# Patient Record
Sex: FEMALE | Race: WHITE
[De-identification: names, ages, dates, MRNs, and addresses within clinical notes are randomized per-mention and may not be internally consistent; named-entity substitution may affect disease eponyms.]

---

## 2017-07-29 ENCOUNTER — HEALTH MAINTENANCE LETTER (OUTPATIENT)
Age: 37
End: 2017-07-29

## 2018-05-02 ENCOUNTER — MYC MEDICAL ADVICE (OUTPATIENT)
Dept: RHEUMATOLOGY | Facility: CLINIC | Age: 38
End: 2018-05-02

## 2019-06-26 ENCOUNTER — TELEPHONE (OUTPATIENT)
Dept: RHEUMATOLOGY | Facility: CLINIC | Age: 39
End: 2019-06-26

## 2019-06-26 NOTE — TELEPHONE ENCOUNTER
Patient contacted and reminded of upcoming appointment.  Patient confirmed they will be attending.  Patient instructed to bring updated medications list to appointment.        Neno Jones  EMT

## 2019-06-27 ENCOUNTER — OFFICE VISIT (OUTPATIENT)
Dept: RHEUMATOLOGY | Facility: CLINIC | Age: 39
End: 2019-06-27
Attending: INTERNAL MEDICINE
Payer: COMMERCIAL

## 2019-06-27 VITALS
DIASTOLIC BLOOD PRESSURE: 75 MMHG | WEIGHT: 180 LBS | BODY MASS INDEX: 24.41 KG/M2 | HEART RATE: 65 BPM | SYSTOLIC BLOOD PRESSURE: 150 MMHG | TEMPERATURE: 98 F | OXYGEN SATURATION: 98 %

## 2019-06-27 DIAGNOSIS — R19.7 INTERMITTENT DIARRHEA: ICD-10-CM

## 2019-06-27 DIAGNOSIS — K59.09 INTERMITTENT CONSTIPATION: ICD-10-CM

## 2019-06-27 DIAGNOSIS — R76.8 POSITIVE ANA (ANTINUCLEAR ANTIBODY): ICD-10-CM

## 2019-06-27 DIAGNOSIS — R76.8 POSITIVE ANA (ANTINUCLEAR ANTIBODY): Primary | ICD-10-CM

## 2019-06-27 LAB
ALBUMIN SERPL-MCNC: 3.9 G/DL (ref 3.4–5)
ALBUMIN UR-MCNC: NEGATIVE MG/DL
ALT SERPL W P-5'-P-CCNC: 36 U/L (ref 0–50)
APPEARANCE UR: CLEAR
AST SERPL W P-5'-P-CCNC: 26 U/L (ref 0–45)
BACTERIA #/AREA URNS HPF: ABNORMAL /HPF
BASOPHILS # BLD AUTO: 0 10E9/L (ref 0–0.2)
BASOPHILS NFR BLD AUTO: 0.5 %
BILIRUB UR QL STRIP: NEGATIVE
C3 SERPL-MCNC: 75 MG/DL (ref 76–169)
C4 SERPL-MCNC: 20 MG/DL (ref 15–50)
COLOR UR AUTO: YELLOW
CREAT SERPL-MCNC: 0.77 MG/DL (ref 0.52–1.04)
CREAT UR-MCNC: 73 MG/DL
CREAT UR-MCNC: 73 MG/DL
CRP SERPL-MCNC: <2.9 MG/L (ref 0–8)
DAT POLY-SP REAG RBC QL: NORMAL
DEPRECATED CALCIDIOL+CALCIFEROL SERPL-MC: 33 UG/L (ref 20–75)
DIFFERENTIAL METHOD BLD: NORMAL
ENA RNP IGG SER IA-ACNC: 0.3 AI (ref 0–0.9)
ENA SCL70 IGG SER IA-ACNC: <0.2 AI (ref 0–0.9)
ENA SM IGG SER-ACNC: <0.2 AI (ref 0–0.9)
ENA SS-A IGG SER IA-ACNC: <0.2 AI (ref 0–0.9)
ENA SS-B IGG SER IA-ACNC: <0.2 AI (ref 0–0.9)
EOSINOPHIL # BLD AUTO: 0.1 10E9/L (ref 0–0.7)
EOSINOPHIL NFR BLD AUTO: 0.9 %
ERYTHROCYTE [DISTWIDTH] IN BLOOD BY AUTOMATED COUNT: 12.2 % (ref 10–15)
ERYTHROCYTE [SEDIMENTATION RATE] IN BLOOD BY WESTERGREN METHOD: 8 MM/H (ref 0–20)
GFR SERPL CREATININE-BSD FRML MDRD: >90 ML/MIN/{1.73_M2}
GLUCOSE UR STRIP-MCNC: NEGATIVE MG/DL
HCT VFR BLD AUTO: 41.8 % (ref 35–47)
HGB BLD-MCNC: 13.8 G/DL (ref 11.7–15.7)
HGB UR QL STRIP: NEGATIVE
IMM GRANULOCYTES # BLD: 0 10E9/L (ref 0–0.4)
IMM GRANULOCYTES NFR BLD: 0.2 %
KETONES UR STRIP-MCNC: NEGATIVE MG/DL
LEUKOCYTE ESTERASE UR QL STRIP: NEGATIVE
LYMPHOCYTES # BLD AUTO: 2.2 10E9/L (ref 0.8–5.3)
LYMPHOCYTES NFR BLD AUTO: 39 %
MCH RBC QN AUTO: 31.3 PG (ref 26.5–33)
MCHC RBC AUTO-ENTMCNC: 33 G/DL (ref 31.5–36.5)
MCV RBC AUTO: 95 FL (ref 78–100)
MONOCYTES # BLD AUTO: 0.5 10E9/L (ref 0–1.3)
MONOCYTES NFR BLD AUTO: 8.3 %
MUCOUS THREADS #/AREA URNS LPF: PRESENT /LPF
NEUTROPHILS # BLD AUTO: 2.8 10E9/L (ref 1.6–8.3)
NEUTROPHILS NFR BLD AUTO: 51.1 %
NITRATE UR QL: NEGATIVE
NRBC # BLD AUTO: 0 10*3/UL
NRBC BLD AUTO-RTO: 0 /100
PH UR STRIP: 6 PH (ref 5–7)
PLATELET # BLD AUTO: 192 10E9/L (ref 150–450)
PROT UR-MCNC: 0.06 G/L
PROT/CREAT 24H UR: 0.09 G/G CR (ref 0–0.2)
RBC # BLD AUTO: 4.41 10E12/L (ref 3.8–5.2)
RBC #/AREA URNS AUTO: 1 /HPF (ref 0–2)
RHEUMATOID FACT SER NEPH-ACNC: <20 IU/ML (ref 0–20)
SOURCE: ABNORMAL
SP GR UR STRIP: 1.01 (ref 1–1.03)
SQUAMOUS #/AREA URNS AUTO: 1 /HPF (ref 0–1)
THYROGLOB AB SERPL IA-ACNC: <20 IU/ML (ref 0–40)
THYROPEROXIDASE AB SERPL-ACNC: <10 IU/ML
UROBILINOGEN UR STRIP-MCNC: 0 MG/DL (ref 0–2)
WBC # BLD AUTO: 5.6 10E9/L (ref 4–11)
WBC #/AREA URNS AUTO: 1 /HPF (ref 0–5)

## 2019-06-27 PROCEDURE — 85652 RBC SED RATE AUTOMATED: CPT | Performed by: INTERNAL MEDICINE

## 2019-06-27 PROCEDURE — 86146 BETA-2 GLYCOPROTEIN ANTIBODY: CPT | Performed by: INTERNAL MEDICINE

## 2019-06-27 PROCEDURE — 86880 COOMBS TEST DIRECT: CPT | Performed by: INTERNAL MEDICINE

## 2019-06-27 PROCEDURE — 86376 MICROSOMAL ANTIBODY EACH: CPT | Performed by: INTERNAL MEDICINE

## 2019-06-27 PROCEDURE — 82565 ASSAY OF CREATININE: CPT | Performed by: INTERNAL MEDICINE

## 2019-06-27 PROCEDURE — 86431 RHEUMATOID FACTOR QUANT: CPT | Performed by: INTERNAL MEDICINE

## 2019-06-27 PROCEDURE — 85025 COMPLETE CBC W/AUTO DIFF WBC: CPT | Performed by: INTERNAL MEDICINE

## 2019-06-27 PROCEDURE — 84156 ASSAY OF PROTEIN URINE: CPT | Performed by: INTERNAL MEDICINE

## 2019-06-27 PROCEDURE — 36415 COLL VENOUS BLD VENIPUNCTURE: CPT | Performed by: INTERNAL MEDICINE

## 2019-06-27 PROCEDURE — 82306 VITAMIN D 25 HYDROXY: CPT | Performed by: INTERNAL MEDICINE

## 2019-06-27 PROCEDURE — 86235 NUCLEAR ANTIGEN ANTIBODY: CPT | Performed by: INTERNAL MEDICINE

## 2019-06-27 PROCEDURE — 84460 ALANINE AMINO (ALT) (SGPT): CPT | Performed by: INTERNAL MEDICINE

## 2019-06-27 PROCEDURE — G0463 HOSPITAL OUTPT CLINIC VISIT: HCPCS | Mod: ZF

## 2019-06-27 PROCEDURE — 82040 ASSAY OF SERUM ALBUMIN: CPT | Performed by: INTERNAL MEDICINE

## 2019-06-27 PROCEDURE — 86200 CCP ANTIBODY: CPT | Performed by: INTERNAL MEDICINE

## 2019-06-27 PROCEDURE — 86800 THYROGLOBULIN ANTIBODY: CPT | Performed by: INTERNAL MEDICINE

## 2019-06-27 PROCEDURE — 86160 COMPLEMENT ANTIGEN: CPT | Performed by: INTERNAL MEDICINE

## 2019-06-27 PROCEDURE — 81001 URINALYSIS AUTO W/SCOPE: CPT | Performed by: INTERNAL MEDICINE

## 2019-06-27 PROCEDURE — 86140 C-REACTIVE PROTEIN: CPT | Performed by: INTERNAL MEDICINE

## 2019-06-27 PROCEDURE — 84450 TRANSFERASE (AST) (SGOT): CPT | Performed by: INTERNAL MEDICINE

## 2019-06-27 PROCEDURE — 86225 DNA ANTIBODY NATIVE: CPT | Performed by: INTERNAL MEDICINE

## 2019-06-27 RX ORDER — TURMERIC 100 %
POWDER (GRAM) MISCELLANEOUS
COMMUNITY
Start: 2018-06-15

## 2019-06-27 ASSESSMENT — PAIN SCALES - GENERAL: PAINLEVEL: NO PAIN (0)

## 2019-06-27 NOTE — LETTER
Patient:  Beth Gold  :   1980  MRN:     [id number]        Ms.Tonya Gold  [address] [place], [place] [id number]        2019    Dear ,    We are writing to inform you of your test results. Overall labs look good (good news!). C3 is slightly down but is within acceptable range. Recommend vit D 2000 units a day to keep vit D around 40.      Results for orders placed or performed in visit on 19   Anti thyroglobulin antibody   Result Value Ref Range    Thyroglobulin Antibody <20 <40 IU/mL   Thyroid peroxidase antibody   Result Value Ref Range    Thyroid Peroxidase Antibody <10 <35 IU/mL   Vitamin D Deficiency   Result Value Ref Range    Vitamin D Deficiency screening 33 20 - 75 ug/L   BARBIE Panel (RNP, SMITH, Scleroderma, SSAB, SSBB); BARBIE, Antibodies, Panel   Result Value Ref Range    RNP Antibody IgG 0.3 0.0 - 0.9 AI    [place] BARBIE Antibody IgG <0.2 0.0 - 0.9 AI    SSA (Ro) (BARBIE) Antibody, IgG <0.2 0.0 - 0.9 AI    SSB (La) (BARBIE) Antibody, IgG <0.2 0.0 - 0.9 AI    Scleroderma Antibody Scl-70 BARBIE IgG <0.2 0.0 - 0.9 AI   Rheumatoid factor   Result Value Ref Range    Rheumatoid Factor <20 <20 IU/mL   Cyclic Citrullinated Peptide Antibody IgG   Result Value Ref Range    Cyclic Citrullinated Peptide Antibody, IgG 1 <7 U/mL   Beta 2 Glycoprotein 1 Antibody IgG   Result Value Ref Range    Beta 2 Glycoprotein 1 Antibody IgG 1.0 <7 U/mL   Beta 2 Glycoprotein 1 Antibody IgM   Result Value Ref Range    Beta 2 Glycoprotein 1 Antibody IgM 5.2 <7 U/mL   Creatinine random urine   Result Value Ref Range    Creatinine Urine Random 73 mg/dL   Protein  random urine with Creat Ratio   Result Value Ref Range    Protein Random Urine 0.06 g/L    Protein Total Urine g/gr Creatinine 0.09 0 - 0.2 g/g Cr   UA with Microscopic reflex to Culture   Result Value Ref Range    Color Urine Yellow     Appearance Urine Clear     Glucose Urine Negative NEG^Negative mg/dL    Bilirubin Urine Negative NEG^Negative    Ketones  Urine Negative NEG^Negative mg/dL    Specific Gravity Urine 1.015 1.003 - 1.035    Blood Urine Negative NEG^Negative    pH Urine 6.0 5.0 - 7.0 pH    Protein Albumin Urine Negative NEG^Negative mg/dL    Urobilinogen mg/dL 0.0 0.0 - 2.0 mg/dL    Nitrite Urine Negative NEG^Negative    Leukocyte Esterase Urine Negative NEG^Negative    Source Midstream Urine     WBC Urine 1 0 - 5 /HPF    RBC Urine 1 0 - 2 /HPF    Bacteria Urine Few (A) NEG^Negative /HPF    Squamous Epithelial /HPF Urine 1 0 - 1 /HPF    Mucous Urine Present (A) NEG^Negative /LPF   Erythrocyte sedimentation rate auto   Result Value Ref Range    Sed Rate 8 0 - 20 mm/h   DNA double stranded antibodies   Result Value Ref Range    DNA-ds 2 <10 IU/mL   CRP inflammation   Result Value Ref Range    CRP Inflammation <2.9 0.0 - 8.0 mg/L   Complement C3   Result Value Ref Range    Complement C3 75 (L) 76 - 169 mg/dL   Complement C4   Result Value Ref Range    Complement C4 20 15 - 50 mg/dL   Creatinine   Result Value Ref Range    Creatinine 0.77 0.52 - 1.04 mg/dL    GFR Estimate >90 >60 mL/min/[1.73_m2]    GFR Estimate If Black >90 >60 mL/min/[1.73_m2]   CBC with platelets differential   Result Value Ref Range    WBC 5.6 4.0 - 11.0 10e9/L    RBC Count 4.41 3.8 - 5.2 10e12/L    Hemoglobin 13.8 11.7 - 15.7 g/dL    Hematocrit 41.8 35.0 - 47.0 %    MCV 95 78 - 100 fl    MCH 31.3 26.5 - 33.0 pg    MCHC 33.0 31.5 - 36.5 g/dL    RDW 12.2 10.0 - 15.0 %    Platelet Count 192 150 - 450 10e9/L    Diff Method Automated Method     % Neutrophils 51.1 %    % Lymphocytes 39.0 %    % Monocytes 8.3 %    % Eosinophils 0.9 %    % Basophils 0.5 %    % Immature Granulocytes 0.2 %    Nucleated RBCs 0 0 /100    Absolute Neutrophil 2.8 1.6 - 8.3 10e9/L    Absolute Lymphocytes 2.2 0.8 - 5.3 10e9/L    Absolute Monocytes 0.5 0.0 - 1.3 10e9/L    Absolute Eosinophils 0.1 0.0 - 0.7 10e9/L    Absolute Basophils 0.0 0.0 - 0.2 10e9/L    Abs Immature Granulocytes 0.0 0 - 0.4 10e9/L    Absolute  Nucleated RBC 0.0    AST   Result Value Ref Range    AST 26 0 - 45 U/L   Albumin level   Result Value Ref Range    Albumin 3.9 3.4 - 5.0 g/dL   ALT   Result Value Ref Range    ALT 36 0 - 50 U/L   Creatinine urine calculation only   Result Value Ref Range    Creatinine Urine 73 mg/dL   Direct antiglobulin test (DATG)   Result Value Ref Range    MARS  Broad Spectrum Neg        [facility]ATZ

## 2019-06-27 NOTE — PROGRESS NOTES
Rheumatology Clinic Visit  Beth Gold MRN# [id number]   YOB: 1980 Age: 39 year old     Date of last Visit: 8/12/2016  DOS; 6/27/2019  Reason for visit: F/U possible CTD with high titer ROBINSON (10.9 on EIA).          Assessment and Plan:   Diagnosis:  # Positive ROBINSON, high titer EIA  # Peripheral Polyarticular Arthralgias  # WPW  # h/o Migraines  # past right knee trauma and arthroscopy    Discussion:  Ongoing arthralgia, AM stiffness but tolerable plus markedly elevated ROBINSON by EIA at 10.9. Normal exam, joint pain improved since last visit without taking any medications.    Plan:  GI referral given intermittent diarrhea, constipation  Serologies eval for SLE, other CTD, Antiphospholipid syndrome   Discussed at length symptoms to monitor for and watch.  F/U PRN         Orders Placed This Encounter   Procedures     ALT     Albumin level     AST     CBC with platelets differential     Creatinine     Complement C4     Complement C3     CRP inflammation     DNA double stranded antibodies     Erythrocyte sedimentation rate auto     UA with Microscopic reflex to Culture     Protein  random urine with Creat Ratio     Creatinine random urine     Beta 2 Glycoprotein 1 Antibody IgM     Beta 2 Glycoprotein 1 Antibody IgG     Cyclic Citrullinated Peptide Antibody IgG     Rheumatoid factor     BARBIE Panel (RNP, SMITH, Scleroderma, SSAB, SSBB); BARBIE, Antibodies, Panel     Vitamin D Deficiency     Thyroid peroxidase antibody     Anti thyroglobulin antibody     GASTROENTEROLOGY ADULT REF CONSULT ONLY     Direct antiglobulin test (DATG)               Active Problem List:   Nocturnal Arthralgias, improving         History of Present Illness:   Beth Gold is a 36 year old female who presents for further evaluation regarding possible CTD with high titer ROBINSON.    Beth Gold is a 35 y/o female with h/o WPW, MVR, migraines with visual aura, h/o right knee ACL repair, and infertility s/p twin boy delivery with IVF who presents  "to rheumatology for evaluation of potential CTD given her positive ROBINSON and arthralgias.     She was recently seen by Hematology/Oncology to be evaluated for hemachromatosis as her mother has hemachromatosis with involvement of her brain. Subsequent evaluation in [place] is negative for hemachromatosis, but returned markedly elevated ROBINSON by EIA at 10.9.     Beth mentions that she is overall healthy and active but noticed after the delivery of her twins she developed significant sharp stabbing pains in her wrist, left elbow (also got swollen), shoulder and hands bilaterally. Her pains and stiffness only occurred at night and she did not have any residual issues during the day. This was worsened with the subsequent sleep deprivation from twin newborns and she saw rheumatology and was found to have positive ROBINSON, but negative RA, SPEP, Lyme, SLE, or Sjogren evaluations. It was suspected to be related to her sleep and sleep deprivation and she noticed some improvement when she was able to get sleep. It overall mildly improved until the fall of 2015 when she had significant improvement in her joint pains because her kids started sleeping more consistently. Now she will notice when she \"needs to go to bed early\" and will be able to curb any stiffness in her peripheral joints. She denies any swelling now nor any erythema. She mentions that occasionally she will have soreness in the AM that will last for a couple of hours and resolve by the time the kids are in school. She notes that maybe 1 week before and a little after her menstruation she will have some soreness as well. She denies any consistent AM daily stiffness.    She denied any issues with new rash, raynauds, no mouth ulcers, no uveitis, no LBP or hip pain, no psoriasis, no IBD, no hematochezia, no hemoptysis, no dry eyes or dry mouth, no hematuria.      Today 6/27/2019: She was seen in 2016 by our former fellow Dr. López. Returned for f/u, has some ongoing sx.  Her " legs, ankles, feet are stiff in AM x 50 steps then they feel better. Has some focus issues over past few weeks with migraines. Has irregular BM, with constipation and diarrhea. No blood in stool. Has not discussed irregular BM with her PCP.      No skin rashes. Gets hives from Sun exposure. No oral ulcers. Gets nosebleeds 1-2/ months. Has dry eyes. No dry mouth, CP, SOB.    Lack of sleep and sugar worsen AM stiffness.      Gets hand,wrist, feet and ankle pain if she does not get enough sleep.    2012 with Pos ROBINSON 1:160, negative SS-A, SS-B, negative SPEP, negative Lyme, negative CCP, neg RF. Neg HIV, neg HepBsAg.  2016: ROBINSON 1:80, with CRP 0.08, ESR 6         Review of Systems:   A comprehensive ROS was done. Positives are per HPI.            Past Medical History:     Per outside records.   WPW without ablation (recommended in ) and has been asymptomatic.  H/o migraine with visual aura  H/o right knee ACL tear with repair and subsequent arthroscopies.  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   Denies any smoking  Rare ETOH  Denies any illicit or recreational drug use.         Family History:     Mother with Hemachromatosis and subsequent NBIA (brain involvement), HTN  Father with HTN, DM2, MI, x3, Thyroid issues.    Grandmother with RA, denies any other family members with SLE.   Her two children with no congenital heart block or  SLE.         Allergies:     Allergies   Allergen Reactions     Celebrex [Celecoxib] Anaphylaxis     Celebrex      Amoxicillin Rash   Mentions burns easily with sun exposure.         Medications:     Current Outpatient Medications   Medication Sig Dispense Refill     Calcium Carb-Cholecalciferol (CALCIUM-VITAMIN D) 600-400 MG-UNIT TABS        Coenzyme Q10 (COQ-10 PO)        GLUCOSAMINE-CHONDROITIN PO        Multiple Vitamins-Minerals (MULTIVITAMIN ADULT PO)        NEW MED Atheltic Greens       Omega-3 Fatty Acids (SUPER OMEGA 3 EPA/DHA PO) Take 660 mg by mouth 2 times daily       Turmeric POWD               Physical Exam:   Blood pressure 150/75, pulse 65, temperature 98  F (36.7  C), temperature source Oral, weight 81.6 kg (180 lb), SpO2 98 %.  Wt Readings from Last 4 Encounters:   06/27/19 81.6 kg (180 lb)   09/07/16 83.1 kg (183 lb 3.2 oz)   08/12/16 83 kg (183 lb)   08/03/16 83.5 kg (184 lb 1.6 oz)     Patient height not recorded    Constitutional: pleasant,healthy looking,  and twin sons present  Eyes: nl EOM, PERRLA, conjunctiva, sclera  ENT: nl external ears, nose, lips, teeth, gums, throat  No mucous membrane lesions, normal saliva pool, no parotid enlargement  Neck: no mass or thyroid enlargement  Resp: lungs clear to auscultation  CV: RRR, no murmurs, rubs or gallops, no edema  GI: soft, no ABD tenderness  Lymph: no cervical, supraclavicular nodes  MS: no active synovitis or joint tenderness  Skin: no nail pitting, alopecia, rash  Neuro: nl cranial nerves, strength  Psych: nl affect.         Data:     Results for orders placed or performed in visit on 09/07/16   CBC with platelets differential   Result Value Ref Range    WBC 6.2 4.0 - 11.0 10e9/L    RBC Count 4.39 3.8 - 5.2 10e12/L    Hemoglobin 13.3 11.7 - 15.7 g/dL    Hematocrit 39.7 35.0 - 47.0 %    MCV 90 78 - 100 fl    MCH 30.3 26.5 - 33.0 pg    MCHC 33.5 31.5 - 36.5 g/dL    RDW 12.3 10.0 - 15.0 %    Platelet Count 201 150 - 450 10e9/L    Diff Method Automated Method     % Neutrophils 41.6 %    % Lymphocytes 49.8 %    % Monocytes 7.0 %    % Eosinophils 1.0  %    % Basophils 0.3 %    % Immature Granulocytes 0.3 %    Nucleated RBCs 0 0 /100    Absolute Neutrophil 2.6 1.6 - 8.3 10e9/L    Absolute Lymphocytes 3.1 0.8 - 5.3 10e9/L    Absolute Monocytes 0.4 0.0 - 1.3 10e9/L    Absolute Eosinophils 0.1 0.0 - 0.7 10e9/L    Absolute Basophils 0.0 0.0 - 0.2 10e9/L    Abs Immature Granulocytes 0.0 0 - 0.4 10e9/L    Absolute Nucleated RBC 0.0    CRP inflammation   Result Value Ref Range    CRP Inflammation <2.9 0.0 - 8.0 mg/L   Erythrocyte sedimentation rate auto   Result Value Ref Range    Sed Rate 10 0 - 20 mm/h       Recent Labs   Lab Test  08/03/16   1441   WBC  6.3   RBC  4.39   HGB  13.4   HCT  40.6   MCV  93   RDW  12.2   PLT  206   ALBUMIN  4.0   BUN  15     Hemoglobin   Date Value Ref Range Status   09/07/2016 13.3 11.7 - 15.7 g/dL Final   08/03/2016 13.4 11.7 - 15.7 g/dL Final     Urea Nitrogen   Date Value Ref Range Status   08/03/2016 15 7 - 30 mg/dL Final     Sed Rate   Date Value Ref Range Status   09/07/2016 10 0 - 20 mm/h Final     CRP Inflammation   Date Value Ref Range Status   09/07/2016 <2.9 0.0 - 8.0 mg/L Final     AST   Date Value Ref Range Status   08/03/2016 29 0 - 45 U/L Final     Albumin   Date Value Ref Range Status   08/03/2016 4.0 3.4 - 5.0 g/dL Final     Alkaline Phosphatase   Date Value Ref Range Status   08/03/2016 47 40 - 150 U/L Final     ALT   Date Value Ref Range Status   08/03/2016 31 0 - 50 U/L Final     Recent Labs   Lab Test 09/07/16  1217 08/03/16  1441   WBC 6.2 6.3   HGB 13.3 13.4   HCT 39.7 40.6   MCV 90 93    206   BUN  --  15   AST  --  29   ALT  --  31   ALKPHOS  --  47     Other studies: reviewed in Epic    Outside studies/notes reviewed: Care Everywhere from [facility]:  5/2012 with Pos ROBINSON 1:160, negative SS-a, SS-B, negative SPEP, negative Lyme, negative CCP, neg RF. Neg HIV, neg HepBsAg.  7/2016: ROBINSON 1:80, with CRP 0.08, ESR 6    Reviewed Rheumatology lab flowsheet

## 2019-06-27 NOTE — LETTER
6/27/2019      RE: Beth Gold  [address] [place] [id number]       Rheumatology Clinic Visit  Beth Gold MRN# [id number]   YOB: 1980 Age: 39 year old     Date of last Visit: 8/12/2016  DOS; 6/27/2019  Reason for visit: F/U possible CTD with high titer ROBINSON (10.9 on EIA).          Assessment and Plan:   Diagnosis:  # Positive ROBINSON, high titer EIA  # Peripheral Polyarticular Arthralgias  # WPW  # h/o Migraines  # past right knee trauma and arthroscopy    Discussion:  Ongoing arthralgia, AM stiffness but tolerable plus markedly elevated ROBINSON by EIA at 10.9. Normal exam, joint pain improved since last visit without taking any medications.    Plan:  GI referral given intermittent diarrhea, constipation  Serologies eval for SLE, other CTD, Antiphospholipid syndrome   Discussed at length symptoms to monitor for and watch.  F/U PRN         Orders Placed This Encounter   Procedures     ALT     Albumin level     AST     CBC with platelets differential     Creatinine     Complement C4     Complement C3     CRP inflammation     DNA double stranded antibodies     Erythrocyte sedimentation rate auto     UA with Microscopic reflex to Culture     Protein  random urine with Creat Ratio     Creatinine random urine     Beta 2 Glycoprotein 1 Antibody IgM     Beta 2 Glycoprotein 1 Antibody IgG     Cyclic Citrullinated Peptide Antibody IgG     Rheumatoid factor     BARBIE Panel (RNP, SMITH, Scleroderma, SSAB, SSBB); BARBIE, Antibodies, Panel     Vitamin D Deficiency     Thyroid peroxidase antibody     Anti thyroglobulin antibody     GASTROENTEROLOGY ADULT REF CONSULT ONLY     Direct antiglobulin test (DATG)               Active Problem List:   Nocturnal Arthralgias, improving         History of Present Illness:   Beth Gold is a 36 year old female who presents for further evaluation regarding possible CTD with high titer ROBINSON.    Beth Gold is a 37 y/o female with h/o WPW, MVR, migraines with visual aura, h/o right  "knee ACL repair, and infertility s/p twin boy delivery with IVF who presents to rheumatology for evaluation of potential CTD given her positive ROBINSON and arthralgias.     She was recently seen by Hematology/Oncology to be evaluated for hemachromatosis as her mother has hemachromatosis with involvement of her brain. Subsequent evaluation in [place] is negative for hemachromatosis, but returned markedly elevated ROBINSON by EIA at 10.9.     Beth mentions that she is overall healthy and active but noticed after the delivery of her twins she developed significant sharp stabbing pains in her wrist, left elbow (also got swollen), shoulder and hands bilaterally. Her pains and stiffness only occurred at night and she did not have any residual issues during the day. This was worsened with the subsequent sleep deprivation from twin newborns and she saw rheumatology and was found to have positive ROBINSON, but negative RA, SPEP, Lyme, SLE, or Sjogren evaluations. It was suspected to be related to her sleep and sleep deprivation and she noticed some improvement when she was able to get sleep. It overall mildly improved until the fall of 2015 when she had significant improvement in her joint pains because her kids started sleeping more consistently. Now she will notice when she \"needs to go to bed early\" and will be able to curb any stiffness in her peripheral joints. She denies any swelling now nor any erythema. She mentions that occasionally she will have soreness in the AM that will last for a couple of hours and resolve by the time the kids are in school. She notes that maybe 1 week before and a little after her menstruation she will have some soreness as well. She denies any consistent AM daily stiffness.    She denied any issues with new rash, raynauds, no mouth ulcers, no uveitis, no LBP or hip pain, no psoriasis, no IBD, no hematochezia, no hemoptysis, no dry eyes or dry mouth, no hematuria.      Today 6/27/2019: She was seen in 2016 " by our former fellow Dr. López. Returned for f/u, has some ongoing sx.  Her legs, ankles, feet are stiff in AM x 50 steps then they feel better. Has some focus issues over past few weeks with migraines. Has irregular BM, with constipation and diarrhea. No blood in stool. Has not discussed irregular BM with her PCP.      No skin rashes. Gets hives from Sun exposure. No oral ulcers. Gets nosebleeds 1-2/ months. Has dry eyes. No dry mouth, CP, SOB.    Lack of sleep and sugar worsen AM stiffness.      Gets hand,wrist, feet and ankle pain if she does not get enough sleep.    2012 with Pos ROBINSON 1:160, negative SS-A, SS-B, negative SPEP, negative Lyme, negative CCP, neg RF. Neg HIV, neg HepBsAg.  2016: ROBINSON 1:80, with CRP 0.08, ESR 6         Review of Systems:   A comprehensive ROS was done. Positives are per HPI.            Past Medical History:     Per outside records.   WPW without ablation (recommended in ) and has been asymptomatic.  H/o migraine with visual aura  H/o right knee ACL tear with repair and subsequent arthroscopies.  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   Denies any smoking  Rare ETOH  Denies any illicit or recreational drug use.         Family History:     Mother with Hemachromatosis and subsequent NBIA (brain involvement), HTN  Father with HTN, DM2, MI, x3, Thyroid issues.    Grandmother with RA, denies any other family members with SLE.   Her two children with no congenital heart block or  SLE.         Allergies:     Allergies    Allergen Reactions     Celebrex [Celecoxib] Anaphylaxis     Celebrex     Amoxicillin Rash   Mentions burns easily with sun exposure.         Medications:     Current Outpatient Medications   Medication Sig Dispense Refill     Calcium Carb-Cholecalciferol (CALCIUM-VITAMIN D) 600-400 MG-UNIT TABS        Coenzyme Q10 (COQ-10 PO)        GLUCOSAMINE-CHONDROITIN PO        Multiple Vitamins-Minerals (MULTIVITAMIN ADULT PO)        NEW MED Atheltic Greens       Omega-3 Fatty Acids (SUPER OMEGA 3 EPA/DHA PO) Take 660 mg by mouth 2 times daily       Turmeric POWD               Physical Exam:   Blood pressure 150/75, pulse 65, temperature 98  F (36.7  C), temperature source Oral, weight 81.6 kg (180 lb), SpO2 98 %.  Wt Readings from Last 4 Encounters:   06/27/19 81.6 kg (180 lb)   09/07/16 83.1 kg (183 lb 3.2 oz)   08/12/16 83 kg (183 lb)   08/03/16 83.5 kg (184 lb 1.6 oz)     Patient height not recorded    Constitutional: pleasant,healthy looking,  and twin sons present  Eyes: nl EOM, PERRLA, conjunctiva, sclera  ENT: nl external ears, nose, lips, teeth, gums, throat  No mucous membrane lesions, normal saliva pool, no parotid enlargement  Neck: no mass or thyroid enlargement  Resp: lungs clear to auscultation  CV: RRR, no murmurs, rubs or gallops, no edema  GI: soft, no ABD tenderness  Lymph: no cervical, supraclavicular nodes  MS: no active synovitis or joint tenderness  Skin: no nail pitting, alopecia, rash  Neuro: nl cranial nerves, strength  Psych: nl affect.         Data:     Results for orders placed or performed in visit on 09/07/16   CBC with platelets differential   Result Value Ref Range    WBC 6.2 4.0 - 11.0 10e9/L    RBC Count 4.39 3.8 - 5.2 10e12/L    Hemoglobin 13.3 11.7 - 15.7 g/dL    Hematocrit 39.7 35.0 - 47.0 %    MCV 90 78 - 100 fl    MCH 30.3 26.5 - 33.0 pg    MCHC 33.5 31.5 - 36.5 g/dL    RDW 12.3 10.0 - 15.0 %    Platelet Count 201 150 - 450 10e9/L    Diff Method Automated Method     %  Neutrophils 41.6 %    % Lymphocytes 49.8 %    % Monocytes 7.0 %    % Eosinophils 1.0 %    % Basophils 0.3 %    % Immature Granulocytes 0.3 %    Nucleated RBCs 0 0 /100    Absolute Neutrophil 2.6 1.6 - 8.3 10e9/L    Absolute Lymphocytes 3.1 0.8 - 5.3 10e9/L    Absolute Monocytes 0.4 0.0 - 1.3 10e9/L    Absolute Eosinophils 0.1 0.0 - 0.7 10e9/L    Absolute Basophils 0.0 0.0 - 0.2 10e9/L    Abs Immature Granulocytes 0.0 0 - 0.4 10e9/L    Absolute Nucleated RBC 0.0    CRP inflammation   Result Value Ref Range    CRP Inflammation <2.9 0.0 - 8.0 mg/L   Erythrocyte sedimentation rate auto   Result Value Ref Range    Sed Rate 10 0 - 20 mm/h       Recent Labs   Lab Test  08/03/16   1441   WBC  6.3   RBC  4.39   HGB  13.4   HCT  40.6   MCV  93   RDW  12.2   PLT  206   ALBUMIN  4.0   BUN  15     Hemoglobin   Date Value Ref Range Status   09/07/2016 13.3 11.7 - 15.7 g/dL Final   08/03/2016 13.4 11.7 - 15.7 g/dL Final     Urea Nitrogen   Date Value Ref Range Status   08/03/2016 15 7 - 30 mg/dL Final     Sed Rate   Date Value Ref Range Status   09/07/2016 10 0 - 20 mm/h Final     CRP Inflammation   Date Value Ref Range Status   09/07/2016 <2.9 0.0 - 8.0 mg/L Final     AST   Date Value Ref Range Status   08/03/2016 29 0 - 45 U/L Final     Albumin   Date Value Ref Range Status   08/03/2016 4.0 3.4 - 5.0 g/dL Final     Alkaline Phosphatase   Date Value Ref Range Status   08/03/2016 47 40 - 150 U/L Final     ALT   Date Value Ref Range Status   08/03/2016 31 0 - 50 U/L Final     Recent Labs   Lab Test 09/07/16  1217 08/03/16  1441   WBC 6.2 6.3   HGB 13.3 13.4   HCT 39.7 40.6   MCV 90 93    206   BUN  --  15   AST  --  29   ALT  --  31   ALKPHOS  --  47     Other studies: reviewed in Epic    Outside studies/notes reviewed: Care Everywhere from [facility]:  5/2012 with Pos ROBINSON 1:160, negative SS-a, SS-B, negative SPEP, negative Lyme, negative CCP, neg RF. Neg HIV, neg HepBsAg.  7/2016: ROBINSON 1:80, with CRP 0.08, ESR 6    Reviewed  Rheumatology lab flowsheet    Bahman Noble MD

## 2019-06-27 NOTE — NURSING NOTE
Chief Complaint   Patient presents with     RECHECK     Arthritis; Positive ROBINSON     /75 (BP Location: Left arm, Patient Position: Sitting, Cuff Size: Adult Regular)   Pulse 65   Temp 98  F (36.7  C) (Oral)   Wt 81.6 kg (180 lb)   SpO2 98%   BMI 24.41 kg/m    Steffany Martinez CMA    6/27/2019 10:09 AM

## 2019-06-28 LAB
B2 GLYCOPROT1 IGG SERPL IA-ACNC: 1 U/ML
B2 GLYCOPROT1 IGM SERPL IA-ACNC: 5.2 U/ML
CCP AB SER IA-ACNC: 1 U/ML
DSDNA AB SER-ACNC: 2 IU/ML

## 2019-07-05 NOTE — RESULT ENCOUNTER NOTE
Overall labs look good (good news!). C3 is slightly down but is within acceptable range. Recommend vit D 2000 units a day to keep vit D around 40.

## 2019-10-10 ENCOUNTER — DOCUMENTATION ONLY (OUTPATIENT)
Dept: CARE COORDINATION | Facility: CLINIC | Age: 39
End: 2019-10-10

## 2019-10-14 ENCOUNTER — TELEPHONE (OUTPATIENT)
Dept: GASTROENTEROLOGY | Facility: CLINIC | Age: 39
End: 2019-10-14

## 2019-10-14 NOTE — TELEPHONE ENCOUNTER
Called danae M with call back information to reschedule appointment with Emmanuel Brown to 1130 same day. Letter sent with new appointment time

## 2019-10-15 NOTE — TELEPHONE ENCOUNTER
RECORDS RECEIVED FROM: Internal    DATE RECEIVED: 12/5/19   NOTES STATUS DETAILS   OFFICE NOTE from referring provider Internal Internal referral    OFFICE NOTE from other specialist N/A    DISCHARGE SUMMARY from hospital N/A    OPERATIVE REPORT N/A    MEDICATION LIST N/A         ENDOSCOPY  N/A    COLONOSCOPY N/A    ERCP N/A    EUS N/A    STOOL TESTING N/A    PERTINENT LABS Internal    PATHOLOGY REPORTS (RELATED) N/A    IMAGING (CT, MRI, EGD) N/A      REFERRAL INFORMATION    Date referral was placed: 12/5/19   Date all records received: N/A   Date records were scanned into Epic: N/A   Date records were sent to Provider to review: N/A   Date and recommendation received from provider:  LETTER SENT  SCHEDULE APPOINTMENT   Date patient was contacted to schedule: 6/27/19

## 2019-11-04 ENCOUNTER — HEALTH MAINTENANCE LETTER (OUTPATIENT)
Age: 39
End: 2019-11-04

## 2019-12-04 ENCOUNTER — TELEPHONE (OUTPATIENT)
Dept: GASTROENTEROLOGY | Facility: CLINIC | Age: 39
End: 2019-12-04

## 2019-12-04 ASSESSMENT — ENCOUNTER SYMPTOMS
BLOOD IN STOOL: 0
JAUNDICE: 0
DIARRHEA: 1
HEARTBURN: 0
ABDOMINAL PAIN: 0
NAUSEA: 0
BOWEL INCONTINENCE: 0
VOMITING: 0
RECTAL PAIN: 0
CONSTIPATION: 1
BLOATING: 1

## 2019-12-04 NOTE — TELEPHONE ENCOUNTER
Called and left message for patient reminding of appointment scheduled on 12/5/19 at 1120am with [facility]. Patient to arrive 15 min early. To reschedule or cancel patient to call [phone].      LO Wong

## 2019-12-05 ENCOUNTER — ANCILLARY PROCEDURE (OUTPATIENT)
Dept: GENERAL RADIOLOGY | Facility: CLINIC | Age: 39
End: 2019-12-05
Attending: PHYSICIAN ASSISTANT
Payer: COMMERCIAL

## 2019-12-05 ENCOUNTER — OFFICE VISIT (OUTPATIENT)
Dept: GASTROENTEROLOGY | Facility: CLINIC | Age: 39
End: 2019-12-05
Attending: INTERNAL MEDICINE
Payer: COMMERCIAL

## 2019-12-05 ENCOUNTER — PRE VISIT (OUTPATIENT)
Dept: GASTROENTEROLOGY | Facility: CLINIC | Age: 39
End: 2019-12-05

## 2019-12-05 VITALS
SYSTOLIC BLOOD PRESSURE: 133 MMHG | BODY MASS INDEX: 24.57 KG/M2 | DIASTOLIC BLOOD PRESSURE: 81 MMHG | HEART RATE: 71 BPM | OXYGEN SATURATION: 98 % | HEIGHT: 72 IN | WEIGHT: 181.4 LBS

## 2019-12-05 DIAGNOSIS — R19.8 IRREGULAR BOWEL HABITS: Primary | ICD-10-CM

## 2019-12-05 DIAGNOSIS — R19.8 IRREGULAR BOWEL HABITS: ICD-10-CM

## 2019-12-05 LAB — TSH SERPL DL<=0.005 MIU/L-ACNC: 1.64 MU/L (ref 0.4–4)

## 2019-12-05 ASSESSMENT — MIFFLIN-ST. JEOR: SCORE: 1609.83

## 2019-12-05 ASSESSMENT — PAIN SCALES - GENERAL: PAINLEVEL: NO PAIN (0)

## 2019-12-05 NOTE — PROGRESS NOTES
GI CLINIC VISIT    CC/REFERRING MD:  Bahman Noble  REASON FOR CONSULTATION: irregular bowel habits    ASSESSMENT/PLAN:  38 yo female with previous hx of possible connective tissue disease and high titer ROBINSON, WPW, MVR, migraines with visual aura, right knee ACL repair, infertility s/p twin boy delivery with IVF who presents to the GI clinic for consultation regarding irregular bowel movements.      1.  Irregular bowel habits: Patient's pattern seems most consistent with dietary changes that may lead to a change in bowel habits.  I am unaware of bromine pools causing a change in consistency of stools and increased frequency, however I will do a literature search and ask colleagues if this may be a consideration.  Picture does not seem to be consistent with an inflammatory bowel picture, which certainly should be taken into consideration with previous rheumatologic work-up, with high titer ROBINSON.  We will obtain a fecal calprotectin and if elevated proceed with endoscopic assessment.  Patient has not had celiac markers checked which we will obtain today.  Additionally patient has been concerned regarding thyroid function which certainly could contribute to a change in bowel pattern, and we will check a TSH with free T4 today.  Patient does have a history of constipation.  It may be that the days she is experiencing loose stools are following days of infrequent stooling and irregularity may improve slowly with initiation of soluble fiber.  Pending the above we will determine the need for endoscopic assessment and further intervention.  --  Fecal calprotectin  -- TSH, free T4  -- TTG IgA, IgA  -- Abdominal xray    2. Colorectal cancer screening: no personal or FHx of CRC. Recommend at age 45-50 unless symptomatic sooner.    RTC as needed (patient preference)    Thank you for this consultation.  It was a pleasure to participate in the care of this patient; please contact us with any further questions.       Emmanuel Brown  "ROYA  Division of Gastroenterology, Hepatology and Nutrition  [facility]      HPI  38 yo female with previous hx of possible connective tissue disease and high titer ROBINSON, WPW, MVR, migraines with visual aura, right knee ACL repair, infertility s/p twin boy delivery with IVF who presents to the GI clinic for consultation regarding irregular bowel movements.      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 Only other additional notation is foul smelling gas.    No blood in the stool. No urgency. No UGI sxs.    She has been working on clean eating for the last 3-4 years.     ROS:    No fevers or chills  No weight loss  No blurry vision, double vision or change in vision  No sore throat  No lymphadenopathy  No headache, paraesthesias, or weakness in a limb  No shortness of breath or wheezing  No chest pain or pressure  No arthralgias or myalgias  No rashes or skin changes  No odynophagia or dysphagia  No BRBPR, hematochezia, melena  No dysuria, frequency or urgency  No hot/cold intolerance or polyria  No anxiety or depression    PROBLEM LIST  Patient Active Problem List    Diagnosis Date Noted     Hereditary hemochromatosis (H) 09/07/2016     Priority: Medium     Arthritis 09/06/2016     Priority: Medium       PERTINENT PAST MEDICAL HISTORY:  Past Medical History:   Diagnosis Date     Heart rate problem 2001     Other heart disorders in diseases classified elsewhere 2001     Palpitations 2001       PREVIOUS SURGERIES:  Past Surgical History:   Procedure Laterality Date     ORTHOPEDIC SURGERY  [id number]       PREVIOUS ENDOSCOPY:  None    ALLERGIES:     Allergies   Allergen Reactions     Celebrex [Celecoxib] Anaphylaxis     Celebrex     Amoxicillin Rash       PERTINENT " MEDICATIONS:    Current Outpatient Medications:      Calcium Carb-Cholecalciferol (CALCIUM-VITAMIN D) 600-400 MG-UNIT TABS, , Disp: , Rfl:      Coenzyme Q10 (COQ-10 PO), , Disp: , Rfl:      GLUCOSAMINE-CHONDROITIN PO, , Disp: , Rfl:      Multiple Vitamins-Minerals (MULTIVITAMIN ADULT PO), , Disp: , Rfl:      NEW MED, Atheltic Greens, Disp: , Rfl:      Omega-3 Fatty Acids (SUPER OMEGA 3 EPA/DHA PO), Take 660 mg by mouth 2 times daily, Disp: , Rfl:      Turmeric POWD, , Disp: , Rfl:     SOCIAL HISTORY:  Social History     Socioeconomic History     Marital status:      Spouse name: Not on file     Number of children: Not on file     Years of education: Not on file     Highest education level: Not on file   Occupational History     Not on file   Social Needs     Financial resource strain: Not on file     Food insecurity:     Worry: Not on file     Inability: Not on file     Transportation needs:     Medical: Not on file     Non-medical: Not on file   Tobacco Use     Smoking status: Never Smoker     Smokeless tobacco: Never Used   Substance and Sexual Activity     Alcohol use: Yes     Alcohol/week: 0.0 standard drinks     Comment: once every 6 months     Drug use: Never     Sexual activity: Yes     Partners: Male     Birth control/protection: None   Lifestyle     Physical activity:     Days per week: Not on file     Minutes per session: Not on file     Stress: Not on file   Relationships     Social connections:     Talks on phone: Not on file     Gets together: Not on file     Attends Sabianist service: Not on file     Active member of club or organization: Not on file     Attends meetings of clubs or organizations: Not on file     Relationship status: Not on file     Intimate partner violence:     Fear of current or ex partner: Not on file     Emotionally abused: Not on file     Physically abused: Not on file     Forced sexual activity: Not on file   Other Topics Concern     Not on file   Social History Narrative      Not on file       FAMILY HISTORY:  FH of CRC: None  FH of IBD: None  Family History   Problem Relation Age of Onset     Diabetes Father              Coronary Artery Disease Father              Hypertension Father              Hyperlipidemia Father              Thyroid Disease Father      Obesity Father      Diabetes Paternal Grandmother              Coronary Artery Disease Paternal Grandmother              Hypertension Paternal Grandmother              Hyperlipidemia Paternal Grandmother      Cerebrovascular Disease Paternal Grandmother              Thyroid Disease Paternal Grandmother      Obesity Paternal Grandmother      Diabetes Paternal Grandfather              Coronary Artery Disease Paternal Grandfather              Hypertension Paternal Grandfather              Cerebrovascular Disease Paternal Grandfather              Coronary Artery Disease Maternal Grandmother              Hypertension Maternal Grandmother              Obesity Maternal Grandmother      Hypertension Mother      Other Cancer Mother         skin     Depression Mother      Genetic Disorder Mother         NBIA, hemachromatosis     Cerebrovascular Disease Maternal Grandfather              Other Cancer Maternal Grandfather         skin       Past/family/social history reviewed and no changes    PHYSICAL EXAMINATION:  Constitutional: aaox3, cooperative, pleasant, not dyspneic/diaphoretic, no acute distress  Vitals reviewed: There were no vitals taken for this visit.  Wt:   Wt Readings from Last 2 Encounters:   19 81.6 kg (180 lb)   16 83.1 kg (183 lb 3.2 oz)      Eyes: Sclera anicteric/injected  Ears/nose/mouth/throat: Normal oropharynx without ulcers or exudate, mucus membranes moist, hearing intact  Neck: supple, thyroid normal size  CV: No edema  Respiratory: Unlabored breathing  Lymph: No axillary,  submandibular, supraclavicular or inguinal lymphadenopathy  Abd: Nondistended, +bs, no hepatosplenomegaly, nontender, no peritoneal signs  Skin: warm, perfused, no jaundice  Psych: Normal affect  MSK: Normal gait      PERTINENT STUDIES:    Orders Only on 06/27/2019   Component Date Value Ref Range Status     Thyroglobulin Antibody 06/27/2019 <20  <40 IU/mL Final     Thyroid Peroxidase Antibody 06/27/2019 <10  <35 IU/mL Final     Vitamin D Deficiency screening 06/27/2019 33  20 - 75 ug/L Final     RNP Antibody IgG 06/27/2019 0.3  0.0 - 0.9 AI Final     [place] BARBIE Antibody IgG 06/27/2019 <0.2  0.0 - 0.9 AI Final     SSA (Ro) (BARBIE) Antibody, IgG 06/27/2019 <0.2  0.0 - 0.9 AI Final     SSB (La) (BARBIE) Antibody, IgG 06/27/2019 <0.2  0.0 - 0.9 AI Final     Scleroderma Antibody Scl-70 BARBIE IgG 06/27/2019 <0.2  0.0 - 0.9 AI Final     Rheumatoid Factor 06/27/2019 <20  <20 IU/mL Final     Cyclic Citrullinated Peptide Antib* 06/27/2019 1  <7 U/mL Final     MARS  Broad Spectrum 06/27/2019 Neg   Final     Beta 2 Glycoprotein 1 Antibody IgG 06/27/2019 1.0  <7 U/mL Final     Beta 2 Glycoprotein 1 Antibody IgM 06/27/2019 5.2  <7 U/mL Final     Creatinine Urine Random 06/27/2019 73  mg/dL Final     Protein Random Urine 06/27/2019 0.06  g/L Final     Protein Total Urine g/gr Creatinine 06/27/2019 0.09  0 - 0.2 g/g Cr Final     Color Urine 06/27/2019 Yellow   Final     Appearance Urine 06/27/2019 Clear   Final     Glucose Urine 06/27/2019 Negative  NEG^Negative mg/dL Final     Bilirubin Urine 06/27/2019 Negative  NEG^Negative Final     Ketones Urine 06/27/2019 Negative  NEG^Negative mg/dL Final     Specific Gravity Urine 06/27/2019 1.015  1.003 - 1.035 Final     Blood Urine 06/27/2019 Negative  NEG^Negative Final     pH Urine 06/27/2019 6.0  5.0 - 7.0 pH Final     Protein Albumin Urine 06/27/2019 Negative  NEG^Negative mg/dL Final     Urobilinogen mg/dL 06/27/2019 0.0  0.0 - 2.0 mg/dL Final     Nitrite Urine 06/27/2019 Negative   NEG^Negative Final     Leukocyte Esterase Urine 06/27/2019 Negative  NEG^Negative Final     Source 06/27/2019 Midstream Urine   Final     WBC Urine 06/27/2019 1  0 - 5 /HPF Final     RBC Urine 06/27/2019 1  0 - 2 /HPF Final     Bacteria Urine 06/27/2019 Few* NEG^Negative /HPF Final     Squamous Epithelial /HPF Urine 06/27/2019 1  0 - 1 /HPF Final     Mucous Urine 06/27/2019 Present* NEG^Negative /LPF Final     Sed Rate 06/27/2019 8  0 - 20 mm/h Final     DNA-ds 06/27/2019 2  <10 IU/mL Final     CRP Inflammation 06/27/2019 <2.9  0.0 - 8.0 mg/L Final     Complement C3 06/27/2019 75* 76 - 169 mg/dL Final     Complement C4 06/27/2019 20  15 - 50 mg/dL Final     Creatinine 06/27/2019 0.77  0.52 - 1.04 mg/dL Final     GFR Estimate 06/27/2019 >90  >60 mL/min/[1.73_m2] Final     GFR Estimate If Black 06/27/2019 >90  >60 mL/min/[1.73_m2] Final     WBC 06/27/2019 5.6  4.0 - 11.0 10e9/L Final     RBC Count 06/27/2019 4.41  3.8 - 5.2 10e12/L Final     Hemoglobin 06/27/2019 13.8  11.7 - 15.7 g/dL Final     Hematocrit 06/27/2019 41.8  35.0 - 47.0 % Final     MCV 06/27/2019 95  78 - 100 fl Final     MCH 06/27/2019 31.3  26.5 - 33.0 pg Final     MCHC 06/27/2019 33.0  31.5 - 36.5 g/dL Final     RDW 06/27/2019 12.2  10.0 - 15.0 % Final     Platelet Count 06/27/2019 192  150 - 450 10e9/L Final     Diff Method 06/27/2019 Automated Method   Final     % Neutrophils 06/27/2019 51.1  % Final     % Lymphocytes 06/27/2019 39.0  % Final     % Monocytes 06/27/2019 8.3  % Final     % Eosinophils 06/27/2019 0.9  % Final     % Basophils 06/27/2019 0.5  % Final     % Immature Granulocytes 06/27/2019 0.2  % Final     Nucleated RBCs 06/27/2019 0  0 /100 Final     Absolute Neutrophil 06/27/2019 2.8  1.6 - 8.3 10e9/L Final     Absolute Lymphocytes 06/27/2019 2.2  0.8 - 5.3 10e9/L Final     Absolute Monocytes 06/27/2019 0.5  0.0 - 1.3 10e9/L Final     Absolute Eosinophils 06/27/2019 0.1  0.0 - 0.7 10e9/L Final     Absolute Basophils 06/27/2019 0.0  0.0  - 0.2 10e9/L Final     Abs Immature Granulocytes 06/27/2019 0.0  0 - 0.4 10e9/L Final     Absolute Nucleated RBC 06/27/2019 0.0   Final     AST 06/27/2019 26  0 - 45 U/L Final     Albumin 06/27/2019 3.9  3.4 - 5.0 g/dL Final     ALT 06/27/2019 36  0 - 50 U/L Final     Creatinine Urine 06/27/2019 73  mg/dL Final         Answers for HPI/ROS submitted by the patient on 12/4/2019   General Symptoms: No  Skin Symptoms: No  HENT Symptoms: No  EYE SYMPTOMS: No  HEART SYMPTOMS: No  LUNG SYMPTOMS: No  INTESTINAL SYMPTOMS: Yes  URINARY SYMPTOMS: No  GYNECOLOGIC SYMPTOMS: No  BREAST SYMPTOMS: No  SKELETAL SYMPTOMS: No  BLOOD SYMPTOMS: No  NERVOUS SYSTEM SYMPTOMS: No  MENTAL HEALTH SYMPTOMS: No  Heart burn or indigestion: No  Nausea: No  Vomiting: No  Abdominal pain: No  Bloating: Yes  Constipation: Yes  Diarrhea: Yes  Blood in stool: No  Black stools: No  Rectal or Anal pain: No  Fecal incontinence: No  Yellowing of skin or eyes: No  Vomit with blood: No  Change in stools: Yes

## 2019-12-05 NOTE — PATIENT INSTRUCTIONS
It was a pleasure taking care of you today.  I've included a brief summary of our discussion and care plan from today's visit below.  Please review this information with your primary care provider.  ______________________________________________________________________    My recommendations are summarized as follows:    --  Stool sample when able   -- Blood work when able  -- Abdominal xray    If there is a stool burden on xray I recommend  -- Recommend soluble fiber supplementation on a daily basis (Metamucil, citrucel or benefiber).  Start with 1 tablespoon per day and if tolerated, may increase up to 2-3 tablespoons per day.  You may experience some bloating with initiation of fiber supplementation that will improve over the first month.  A good fiber trial to evaluate the effect is 3-6 months.  -- Recommend a trial of Miralax.  This is not a stimulant laxative and is safe to take on a daily basis.  Try 1 cap(s) full every day to every other day.  You can titrate this dose (increase or decrease) based on stool frequency and consistency.    Return to GI Clinic as needed to review your progress.    ______________________________________________________________________    Who do I call with any questions after my visit?  Please be in touch if there are any further questions that arise following today's visit.  There are multiple ways to contact your gastroenterology care team.        During business hours, you may reach a Gastroenterology nurse at [phone], option [phone].       To schedule or reschedule an appointment, please call [phone].       You can always send a secure message through "[employer]".  "[employer]" messages are answered by your nurse or doctor typically within 24 hours.  Please allow extra time on weekends and holidays.        For urgent/emergent questions after business hours, you may reach the on-call GI Fellow by contacting the [facility]  at [phone].      In order for your  refill to be processed in a timely fashion, it is your responsibility to ensure you follow the recommendations from your provider regarding your laboratory studies and follow up appointments.       How will I get the results of any tests ordered?    You will receive all of your results.  If you have signed up for [employer], any tests ordered at your visit will be available to you after your physician reviews them.  Typically this takes 1-2 weeks.  If there are urgent results that require a change in your care plan, your physician or nurse will call you to discuss the next steps.      What is [employer]?  [employer] is a secure way for you to access all of your healthcare records from the [facility].  It is a web based computer program, so you can sign on to it from any location.  It also allows you to send secure messages to your care team.  I recommend signing up for [employer] access if you have not already done so and are comfortable with using a computer.      How to I schedule a follow-up visit?  If you did not schedule a follow-up visit today, please call [phone] to schedule a follow-up office visit.        Sincerely,    Emmanuel Brown PA-C  [facility]  Division of Gastroenterology

## 2019-12-05 NOTE — NURSING NOTE
Chief Complaint   Patient presents with     New Patient     New consult, diarrhea       Vitals:    12/05/19 1113   BP: 133/81   Pulse: 71   SpO2: 98%   Weight: 82.3 kg (181 lb 6.4 oz)   Height: 1.829 m (6')       Body mass index is 24.6 kg/m .    Mabel Quintana CMA

## 2019-12-05 NOTE — LETTER
12/5/2019       RE: Beth Gold  [address] [place] [id number]     Dear Colleague,    Thank you for referring your patient, Beth Gold, to the [facility] GASTROENTEROLOGY AND IBD CLINIC at [facility]. Please see a copy of my visit note below.    GI CLINIC VISIT    CC/REFERRING MD:  Bahman Noble  REASON FOR CONSULTATION: irregular bowel habits    ASSESSMENT/PLAN:  38 yo female with previous hx of possible connective tissue disease and high titer ROBINOSN, WPW, MVR, migraines with visual aura, right knee ACL repair, infertility s/p twin boy delivery with IVF who presents to the GI clinic for consultation regarding irregular bowel movements.      1.  Irregular bowel habits: Patient's pattern seems most consistent with dietary changes that may lead to a change in bowel habits.  I am unaware of bromine pools causing a change in consistency of stools and increased frequency, however I will do a literature search and ask colleagues if this may be a consideration.  Picture does not seem to be consistent with an inflammatory bowel picture, which certainly should be taken into consideration with previous rheumatologic work-up, with high titer ROBINSON.  We will obtain a fecal calprotectin and if elevated proceed with endoscopic assessment.  Patient has not had celiac markers checked which we will obtain today.  Additionally patient has been concerned regarding thyroid function which certainly could contribute to a change in bowel pattern, and we will check a TSH with free T4 today.  Patient does have a history of constipation.  It may be that the days she is experiencing loose stools are following days of infrequent stooling and irregularity may improve slowly with initiation of soluble fiber.  Pending the above we will determine the need for endoscopic assessment and further intervention.  --  Fecal calprotectin  -- TSH, free T4  -- TTG IgA, IgA  -- Abdominal xray    2. Colorectal  "cancer screening: no personal or FHx of CRC. Recommend at age 45-50 unless symptomatic sooner.    RTC as needed (patient preference)    Thank you for this consultation.  It was a pleasure to participate in the care of this patient; please contact us with any further questions.       Emmanuel Brown PA-C  Division of Gastroenterology, Hepatology and Nutrition  [facility]      HPI  38 yo female with previous hx of possible connective tissue disease and high titer ROBINSON, WPW, MVR, migraines with visual aura, right knee ACL repair, infertility s/p twin boy delivery with IVF who presents to the GI clinic for consultation regarding irregular bowel movements.      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 Only other additional notation is foul smelling gas.    No blood in the stool. No urgency. No UGI sxs.    She has been working on clean eating for the last 3-4 years.     ROS:    No fevers or chills  No weight loss  No blurry vision, double vision or change in vision  No sore throat  No lymphadenopathy  No headache, paraesthesias, or weakness in a limb  No shortness of breath or wheezing  No chest pain or pressure  No arthralgias or myalgias  No rashes or skin changes  No odynophagia or dysphagia  No BRBPR, hematochezia, melena  No dysuria, frequency or urgency  No hot/cold intolerance or polyria  No anxiety or depression    PROBLEM LIST  Patient Active Problem List    Diagnosis Date Noted     Hereditary hemochromatosis (H) 09/07/2016     Priority: Medium     Arthritis 09/06/2016     Priority: Medium       PERTINENT PAST MEDICAL HISTORY:  Past Medical History:   Diagnosis Date     Heart rate problem 2001     Other heart disorders in diseases classified elsewhere 2001     " Palpitations 2001       PREVIOUS SURGERIES:  Past Surgical History:   Procedure Laterality Date     ORTHOPEDIC SURGERY  [id number]       PREVIOUS ENDOSCOPY:  None    ALLERGIES:     Allergies   Allergen Reactions     Celebrex [Celecoxib] Anaphylaxis     Celebrex     Amoxicillin Rash       PERTINENT MEDICATIONS:    Current Outpatient Medications:      Calcium Carb-Cholecalciferol (CALCIUM-VITAMIN D) 600-400 MG-UNIT TABS, , Disp: , Rfl:      Coenzyme Q10 (COQ-10 PO), , Disp: , Rfl:      GLUCOSAMINE-CHONDROITIN PO, , Disp: , Rfl:      Multiple Vitamins-Minerals (MULTIVITAMIN ADULT PO), , Disp: , Rfl:      NEW MED, Atheltic Greens, Disp: , Rfl:      Omega-3 Fatty Acids (SUPER OMEGA 3 EPA/DHA PO), Take 660 mg by mouth 2 times daily, Disp: , Rfl:      Turmeric POWD, , Disp: , Rfl:     SOCIAL HISTORY:  Social History     Socioeconomic History     Marital status:      Spouse name: Not on file     Number of children: Not on file     Years of education: Not on file     Highest education level: Not on file   Occupational History     Not on file   Social Needs     Financial resource strain: Not on file     Food insecurity:     Worry: Not on file     Inability: Not on file     Transportation needs:     Medical: Not on file     Non-medical: Not on file   Tobacco Use     Smoking status: Never Smoker     Smokeless tobacco: Never Used   Substance and Sexual Activity     Alcohol use: Yes     Alcohol/week: 0.0 standard drinks     Comment: once every 6 months     Drug use: Never     Sexual activity: Yes     Partners: Male     Birth control/protection: None   Lifestyle     Physical activity:     Days per week: Not on file     Minutes per session: Not on file     Stress: Not on file   Relationships     Social connections:     Talks on phone: Not on file     Gets together: Not on file     Attends Oriental orthodox service: Not on file     Active member of club or organization: Not on file     Attends meetings of clubs or organizations: Not  on file     Relationship status: Not on file     Intimate partner violence:     Fear of current or ex partner: Not on file     Emotionally abused: Not on file     Physically abused: Not on file     Forced sexual activity: Not on file   Other Topics Concern     Not on file   Social History Narrative     Not on file       FAMILY HISTORY:  FH of CRC: None  FH of IBD: None  Family History   Problem Relation Age of Onset     Diabetes Father              Coronary Artery Disease Father              Hypertension Father              Hyperlipidemia Father              Thyroid Disease Father      Obesity Father      Diabetes Paternal Grandmother              Coronary Artery Disease Paternal Grandmother              Hypertension Paternal Grandmother              Hyperlipidemia Paternal Grandmother      Cerebrovascular Disease Paternal Grandmother              Thyroid Disease Paternal Grandmother      Obesity Paternal Grandmother      Diabetes Paternal Grandfather              Coronary Artery Disease Paternal Grandfather              Hypertension Paternal Grandfather              Cerebrovascular Disease Paternal Grandfather              Coronary Artery Disease Maternal Grandmother              Hypertension Maternal Grandmother              Obesity Maternal Grandmother      Hypertension Mother      Other Cancer Mother         skin     Depression Mother      Genetic Disorder Mother         NBIA, hemachromatosis     Cerebrovascular Disease Maternal Grandfather              Other Cancer Maternal Grandfather         skin       Past/family/social history reviewed and no changes    PHYSICAL EXAMINATION:  Constitutional: aaox3, cooperative, pleasant, not dyspneic/diaphoretic, no acute distress  Vitals reviewed: There were no vitals taken for this visit.  Wt:   Wt Readings from Last 2 Encounters:   19 81.6 kg (180  lb)   09/07/16 83.1 kg (183 lb 3.2 oz)      Eyes: Sclera anicteric/injected  Ears/nose/mouth/throat: Normal oropharynx without ulcers or exudate, mucus membranes moist, hearing intact  Neck: supple, thyroid normal size  CV: No edema  Respiratory: Unlabored breathing  Lymph: No axillary, submandibular, supraclavicular or inguinal lymphadenopathy  Abd: Nondistended, +bs, no hepatosplenomegaly, nontender, no peritoneal signs  Skin: warm, perfused, no jaundice  Psych: Normal affect  MSK: Normal gait      PERTINENT STUDIES:    Orders Only on 06/27/2019   Component Date Value Ref Range Status     Thyroglobulin Antibody 06/27/2019 <20  <40 IU/mL Final     Thyroid Peroxidase Antibody 06/27/2019 <10  <35 IU/mL Final     Vitamin D Deficiency screening 06/27/2019 33  20 - 75 ug/L Final     RNP Antibody IgG 06/27/2019 0.3  0.0 - 0.9 AI Final     [place] BARBIE Antibody IgG 06/27/2019 <0.2  0.0 - 0.9 AI Final     SSA (Ro) (BARBIE) Antibody, IgG 06/27/2019 <0.2  0.0 - 0.9 AI Final     SSB (La) (BARBIE) Antibody, IgG 06/27/2019 <0.2  0.0 - 0.9 AI Final     Scleroderma Antibody Scl-70 BARBIE IgG 06/27/2019 <0.2  0.0 - 0.9 AI Final     Rheumatoid Factor 06/27/2019 <20  <20 IU/mL Final     Cyclic Citrullinated Peptide Antib* 06/27/2019 1  <7 U/mL Final     MARS  Broad Spectrum 06/27/2019 Neg   Final     Beta 2 Glycoprotein 1 Antibody IgG 06/27/2019 1.0  <7 U/mL Final     Beta 2 Glycoprotein 1 Antibody IgM 06/27/2019 5.2  <7 U/mL Final     Creatinine Urine Random 06/27/2019 73  mg/dL Final     Protein Random Urine 06/27/2019 0.06  g/L Final     Protein Total Urine g/gr Creatinine 06/27/2019 0.09  0 - 0.2 g/g Cr Final     Color Urine 06/27/2019 Yellow   Final     Appearance Urine 06/27/2019 Clear   Final     Glucose Urine 06/27/2019 Negative  NEG^Negative mg/dL Final     Bilirubin Urine 06/27/2019 Negative  NEG^Negative Final     Ketones Urine 06/27/2019 Negative  NEG^Negative mg/dL Final     Specific Gravity Urine 06/27/2019 1.015  1.003 - 1.035  Final     Blood Urine 06/27/2019 Negative  NEG^Negative Final     pH Urine 06/27/2019 6.0  5.0 - 7.0 pH Final     Protein Albumin Urine 06/27/2019 Negative  NEG^Negative mg/dL Final     Urobilinogen mg/dL 06/27/2019 0.0  0.0 - 2.0 mg/dL Final     Nitrite Urine 06/27/2019 Negative  NEG^Negative Final     Leukocyte Esterase Urine 06/27/2019 Negative  NEG^Negative Final     Source 06/27/2019 Midstream Urine   Final     WBC Urine 06/27/2019 1  0 - 5 /HPF Final     RBC Urine 06/27/2019 1  0 - 2 /HPF Final     Bacteria Urine 06/27/2019 Few* NEG^Negative /HPF Final     Squamous Epithelial /HPF Urine 06/27/2019 1  0 - 1 /HPF Final     Mucous Urine 06/27/2019 Present* NEG^Negative /LPF Final     Sed Rate 06/27/2019 8  0 - 20 mm/h Final     DNA-ds 06/27/2019 2  <10 IU/mL Final     CRP Inflammation 06/27/2019 <2.9  0.0 - 8.0 mg/L Final     Complement C3 06/27/2019 75* 76 - 169 mg/dL Final     Complement C4 06/27/2019 20  15 - 50 mg/dL Final     Creatinine 06/27/2019 0.77  0.52 - 1.04 mg/dL Final     GFR Estimate 06/27/2019 >90  >60 mL/min/[1.73_m2] Final     GFR Estimate If Black 06/27/2019 >90  >60 mL/min/[1.73_m2] Final     WBC 06/27/2019 5.6  4.0 - 11.0 10e9/L Final     RBC Count 06/27/2019 4.41  3.8 - 5.2 10e12/L Final     Hemoglobin 06/27/2019 13.8  11.7 - 15.7 g/dL Final     Hematocrit 06/27/2019 41.8  35.0 - 47.0 % Final     MCV 06/27/2019 95  78 - 100 fl Final     MCH 06/27/2019 31.3  26.5 - 33.0 pg Final     MCHC 06/27/2019 33.0  31.5 - 36.5 g/dL Final     RDW 06/27/2019 12.2  10.0 - 15.0 % Final     Platelet Count 06/27/2019 192  150 - 450 10e9/L Final     Diff Method 06/27/2019 Automated Method   Final     % Neutrophils 06/27/2019 51.1  % Final     % Lymphocytes 06/27/2019 39.0  % Final     % Monocytes 06/27/2019 8.3  % Final     % Eosinophils 06/27/2019 0.9  % Final     % Basophils 06/27/2019 0.5  % Final     % Immature Granulocytes 06/27/2019 0.2  % Final     Nucleated RBCs 06/27/2019 0  0 /100 Final     Absolute  Neutrophil 06/27/2019 2.8  1.6 - 8.3 10e9/L Final     Absolute Lymphocytes 06/27/2019 2.2  0.8 - 5.3 10e9/L Final     Absolute Monocytes 06/27/2019 0.5  0.0 - 1.3 10e9/L Final     Absolute Eosinophils 06/27/2019 0.1  0.0 - 0.7 10e9/L Final     Absolute Basophils 06/27/2019 0.0  0.0 - 0.2 10e9/L Final     Abs Immature Granulocytes 06/27/2019 0.0  0 - 0.4 10e9/L Final     Absolute Nucleated RBC 06/27/2019 0.0   Final     AST 06/27/2019 26  0 - 45 U/L Final     Albumin 06/27/2019 3.9  3.4 - 5.0 g/dL Final     ALT 06/27/2019 36  0 - 50 U/L Final     Creatinine Urine 06/27/2019 73  mg/dL Final     Answers for HPI/ROS submitted by the patient on 12/4/2019   General Symptoms: No  Skin Symptoms: No  HENT Symptoms: No  EYE SYMPTOMS: No  HEART SYMPTOMS: No  LUNG SYMPTOMS: No  INTESTINAL SYMPTOMS: Yes  URINARY SYMPTOMS: No  GYNECOLOGIC SYMPTOMS: No  BREAST SYMPTOMS: No  SKELETAL SYMPTOMS: No  BLOOD SYMPTOMS: No  NERVOUS SYSTEM SYMPTOMS: No  MENTAL HEALTH SYMPTOMS: No  Heart burn or indigestion: No  Nausea: No  Vomiting: No  Abdominal pain: No  Bloating: Yes  Constipation: Yes  Diarrhea: Yes  Blood in stool: No  Black stools: No  Rectal or Anal pain: No  Fecal incontinence: No  Yellowing of skin or eyes: No  Vomit with blood: No  Change in stools: Yes

## 2019-12-06 LAB
IGA SERPL-MCNC: 349 MG/DL (ref 70–380)
TTG IGA SER-ACNC: 1 U/ML
TTG IGG SER-ACNC: 1 U/ML

## 2019-12-27 DIAGNOSIS — R19.8 IRREGULAR BOWEL HABITS: ICD-10-CM

## 2019-12-27 PROCEDURE — 83993 ASSAY FOR CALPROTECTIN FECAL: CPT | Performed by: PHYSICIAN ASSISTANT

## 2019-12-30 LAB — CALPROTECTIN STL-MCNT: 12.9 MG/KG (ref 0–49.9)

## 2020-11-22 ENCOUNTER — HEALTH MAINTENANCE LETTER (OUTPATIENT)
Age: 40
End: 2020-11-22

## 2021-09-19 ENCOUNTER — HEALTH MAINTENANCE LETTER (OUTPATIENT)
Age: 41
End: 2021-09-19

## 2022-01-08 ENCOUNTER — HEALTH MAINTENANCE LETTER (OUTPATIENT)
Age: 42
End: 2022-01-08

## 2022-11-20 ENCOUNTER — HEALTH MAINTENANCE LETTER (OUTPATIENT)
Age: 42
End: 2022-11-20

## 2023-04-15 ENCOUNTER — HEALTH MAINTENANCE LETTER (OUTPATIENT)
Age: 43
End: 2023-04-15

## 2024-02-03 ENCOUNTER — HEALTH MAINTENANCE LETTER (OUTPATIENT)
Age: 44
End: 2024-02-03

## 2024-06-22 ENCOUNTER — HEALTH MAINTENANCE LETTER (OUTPATIENT)
Age: 44
End: 2024-06-22